# Patient Record
Sex: MALE | Race: WHITE | HISPANIC OR LATINO
[De-identification: names, ages, dates, MRNs, and addresses within clinical notes are randomized per-mention and may not be internally consistent; named-entity substitution may affect disease eponyms.]

---

## 2018-07-23 PROBLEM — Z00.00 ENCOUNTER FOR PREVENTIVE HEALTH EXAMINATION: Status: ACTIVE | Noted: 2018-07-23

## 2018-11-08 ENCOUNTER — APPOINTMENT (OUTPATIENT)
Dept: INTERNAL MEDICINE | Facility: CLINIC | Age: 38
End: 2018-11-08

## 2020-01-02 ENCOUNTER — EMERGENCY (EMERGENCY)
Facility: HOSPITAL | Age: 40
LOS: 1 days | Discharge: HOME | End: 2020-01-02
Attending: EMERGENCY MEDICINE
Payer: MEDICAID

## 2020-01-02 VITALS
DIASTOLIC BLOOD PRESSURE: 85 MMHG | HEART RATE: 84 BPM | RESPIRATION RATE: 18 BRPM | WEIGHT: 175.05 LBS | HEIGHT: 66 IN | OXYGEN SATURATION: 96 % | TEMPERATURE: 98 F | SYSTOLIC BLOOD PRESSURE: 124 MMHG

## 2020-01-02 PROCEDURE — 99284 EMERGENCY DEPT VISIT MOD MDM: CPT | Mod: 25

## 2020-01-02 PROCEDURE — 99284 EMERGENCY DEPT VISIT MOD MDM: CPT

## 2020-01-02 PROCEDURE — 96374 THER/PROPH/DIAG INJ IV PUSH: CPT

## 2020-01-02 RX ORDER — KETOROLAC TROMETHAMINE 30 MG/ML
60 SYRINGE (ML) INJECTION ONCE
Refills: 0 | Status: DISCONTINUED | OUTPATIENT
Start: 2020-01-02 | End: 2020-01-02

## 2020-01-02 RX ORDER — CYCLOBENZAPRINE HYDROCHLORIDE 10 MG/1
10 TABLET, FILM COATED ORAL ONCE
Refills: 0 | Status: COMPLETED | OUTPATIENT
Start: 2020-01-02 | End: 2020-01-02

## 2020-01-02 RX ORDER — LIDOCAINE 4 G/100G
1 CREAM TOPICAL ONCE
Refills: 0 | Status: COMPLETED | OUTPATIENT
Start: 2020-01-02 | End: 2020-01-02

## 2020-01-02 RX ORDER — CYCLOBENZAPRINE HYDROCHLORIDE 10 MG/1
1 TABLET, FILM COATED ORAL
Qty: 15 | Refills: 0
Start: 2020-01-02 | End: 2020-01-06

## 2020-01-02 RX ORDER — IBUPROFEN 200 MG
1 TABLET ORAL
Qty: 28 | Refills: 0
Start: 2020-01-02 | End: 2020-01-08

## 2020-01-02 RX ADMIN — Medication 60 MILLIGRAM(S): at 19:44

## 2020-01-02 RX ADMIN — LIDOCAINE 1 PATCH: 4 CREAM TOPICAL at 19:44

## 2020-01-02 NOTE — ED PROVIDER NOTE - PHYSICAL EXAMINATION
thoracic and lumabr paraspinall tttp General: In NAD, non-toxic appearing; well nourished/developed.  Skin: No rashes or lesions. Warm, dry, color normal for race.   Neck: Supple, FROM. No spinal TTP. B/l thoracic and lumbar paraspinal TTP.   Cardio: No lifts, heaves, visible pulsations. No thrills. Rate and rhythm regular, S1 & S2 clear. No audible murmur, gallop, or rub.   Resp: Normal AP to lateral diameter, symmetrical excursion b/l. No chest wall tenderness. Breath sounds vesicular, symmetrical and without rales, rhonchi or wheezing b/l.  Msk: Strength 5/5 upper and lower extremities.   Neuro: A&Ox3. CN II-XII grossly intact. Sensation intact to bilateral upper and lower extremities. Normal gait.

## 2020-01-02 NOTE — ED PROVIDER NOTE - NSFOLLOWUPINSTRUCTIONS_ED_ALL_ED_FT
- Prescription sent to pharmacy.  - Do not lift >10 lbs.  - Please call tomorrow to schedule follow up appointment with spine specialist.   - Please call to schedule follow up appointment with your primary care physician within 24-48 hours.  - Please seek immediate medical attention for any new/worsening, signs/symptoms, or concerns.    Feel better!

## 2020-01-02 NOTE — ED ADULT NURSE REASSESSMENT NOTE - NS ED NURSE REASSESS COMMENT FT1
pt hemodynamically stable, refer to flowsheet and chart, pt to be discharged, pt understood discharge instructions and plan of care. Pt medically cleared by CARLOS Shah. Pt refused d/c vs. Pt states "I am ok"

## 2020-01-02 NOTE — ED PROVIDER NOTE - CLINICAL SUMMARY MEDICAL DECISION MAKING FREE TEXT BOX
no red flags 38 yo male PMHx chronic back pain presents to ED c/o back pain x2 days s/p lifting. Paraspinal TTP. No red flags.  Plan:  - Meds  - Spine center

## 2020-01-02 NOTE — ED PROVIDER NOTE - OBJECTIVE STATEMENT
PMHx chronci back pain. was putting something in car two days ago, then began to experience pain. seen by urgent care 3 weeks ago for chronci back pain.   Denies weight loss, IVDU, steroid use, history of cancer, fever/chills, rash, urinary sxms, saddle anesthesia, new onset bowel/bladder incontinence, numbness/tingling. 38 yo male PMHx chronic back pain presents to ED c/o back pain x2 days. States was lifting something into car when began to experience pain. Evaluated by urgent care 3 weeks ago for back pain, but has never been seen by specialist. No further complaints at this time.   Denies weight loss, IVDU, steroid use, history of cancer, fever/chills, rash, urinary sxms, saddle anesthesia, new onset bowel/bladder incontinence, numbness/tingling.

## 2020-01-02 NOTE — ED PROVIDER NOTE - NSFOLLOWUPCLINICS_GEN_ALL_ED_FT
[facility] [place]  [facility]/Spine  [address] [place] [address]  Phone: [phone]  Fax:   Follow Up Time:     [facility] - [place]  Neurosurgery/Spine  [address] [place] [address]  Phone: [phone]  Fax:   Follow Up Time:

## 2020-01-02 NOTE — ED PROVIDER NOTE - ATTENDING CONTRIBUTION TO CARE
40 yo male PMHx chronic back pain presents to ED c/o back pain x2 days. States was lifting something into car when began to experience pain. Evaluated by urgent care 3 weeks ago for back pain, but has never been seen by specialist. No further complaints at this time.   no b/b incontince,  no le weakness no pins/needles  plan outpt analgesics and consider PT

## 2020-01-02 NOTE — ED PROVIDER NOTE - PATIENT PORTAL LINK FT
You can access the FollowMyHealth Patient Portal offered by [facility] by registering at the following website: [email or web address]. By joining [employer]’s FollowMyHealth portal, you will also be able to view your health information using other applications (apps) compatible with our system.